# Patient Record
Sex: FEMALE | Race: WHITE
[De-identification: names, ages, dates, MRNs, and addresses within clinical notes are randomized per-mention and may not be internally consistent; named-entity substitution may affect disease eponyms.]

---

## 2018-06-22 ENCOUNTER — HOSPITAL ENCOUNTER (OUTPATIENT)
Dept: HOSPITAL 63 - SURG | Age: 33
Discharge: HOME | End: 2018-06-22
Attending: ANESTHESIOLOGY
Payer: OTHER GOVERNMENT

## 2018-06-22 DIAGNOSIS — M54.5: Primary | ICD-10-CM

## 2018-06-22 DIAGNOSIS — M54.2: ICD-10-CM

## 2018-06-22 PROCEDURE — 99204 OFFICE O/P NEW MOD 45 MIN: CPT

## 2021-03-10 ENCOUNTER — HOSPITAL ENCOUNTER (OUTPATIENT)
Dept: HOSPITAL 63 - RAD | Age: 36
End: 2021-03-10
Attending: SPECIALIST
Payer: OTHER GOVERNMENT

## 2021-03-10 DIAGNOSIS — M25.571: Primary | ICD-10-CM

## 2021-03-10 PROCEDURE — 73630 X-RAY EXAM OF FOOT: CPT

## 2021-03-10 NOTE — RAD
EXAM: Right foot, 3 views.



HISTORY: Pain.



COMPARISON: None.



FINDINGS: 3 views of the right foot are obtained. There is no fracture, dislocation or subluxation. T
here is no foreign body.



IMPRESSION: No acute osseous finding.



Electronically signed by: Lina Soto MD (3/10/2021 1:36 PM) [id number]

## 2021-06-27 ENCOUNTER — HOSPITAL ENCOUNTER (EMERGENCY)
Dept: HOSPITAL 63 - ER | Age: 36
End: 2021-06-27
Payer: OTHER GOVERNMENT

## 2021-06-27 VITALS — SYSTOLIC BLOOD PRESSURE: 134 MMHG | DIASTOLIC BLOOD PRESSURE: 86 MMHG

## 2021-06-27 VITALS — BODY MASS INDEX: 31.13 KG/M2 | WEIGHT: 182.32 LBS | HEIGHT: 64 IN
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
## 2021-06-27 DIAGNOSIS — S60.222A: Primary | ICD-10-CM
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
## 2021-06-27 PROCEDURE — 73130 X-RAY EXAM OF HAND: CPT

## 2021-06-27 PROCEDURE — 99283 EMERGENCY DEPT VISIT LOW MDM: CPT

## 2021-06-27 NOTE — RAD
Exam performed: X-ray right hand



HISTORY: Smashed right hand.



DATE OF SERVICE: 6/27/2021.



COMPARISON: None available



FINDINGS:



AP, lateral and oblique views of the right hand is obtained. Normal alignment is preserved. There is 
no acute fracture or dislocation. Mild soft tissue swelling is seen. No foreign body.



IMPRESSION:



Mild soft tissue swelling without underlying bony abnormality.



Electronically signed by: Milly Beasley MD (6/27/2021 1:53 PM) [facility]

## 2021-06-27 NOTE — PHYS DOC
General Adult


EDM:


Chief Complaint:  HAND PROBLEM





HPI:


HPI:





Patient is a 35-year-old female who presents with left hand pain.  Patient 

states that she was trying to put a trampoline together when she smashed her 

hand with the spring.  Patient has bruising to her left hand.  Full range of 

motion intact.  Denies taking anything for pain.  Patient has been icing left 

hand.


 (ELIAS LINARES)





Review of Systems:


Review of Systems:


Constitutional:  Denies fever or chills 


Eyes:  Denies change in visual acuity 


HENT:  Denies nasal congestion or sore throat 


Respiratory:  Denies cough or shortness of breath 


Cardiovascular:  Denies chest pain or edema 


GI:  Denies abdominal pain, nausea, vomiting, bloody stools or diarrhea 


: Denies dysuria 


Musculoskeletal: Reports left hand pain


Integument: Reports bruising to left hand


Neurologic:  Denies headache, focal weakness or sensory changes 


Endocrine:  Denies polyuria or polydipsia 


Lymphatic:  Denies swollen glands 


Psychiatric:  Denies depression or anxiety


 (ELIAS LINARES)





Current Medications:


Current Meds:





Current Medications








 Medications


  (Trade)  Dose


 Ordered  Sig/Jud  Start Time


 Stop Time Status Last Admin


Dose Admin


 


 Acetaminophen


  (Tylenol)  500 mg  1X  ONCE  6/27/21 14:15


 6/27/21 14:16 UNV  











 (ELIAS LINARES)





Allergies:


Allergies:





Allergies








Coded Allergies Type Severity Reaction Last Updated Verified


 


  No Known Drug Allergies    6/27/21 No








 (ELIAS LINARES)





Physical Exam:


PE:





Constitutional: Well developed, well nourished, no acute distress, non-toxic 

appearance. []


HENT: Normocephalic, atraumatic, bilateral external ears normal, oropharynx 

moist, no oral exudates, nose normal. []


Eyes: PERRLA, EOMI, conjunctiva normal, no discharge. [] 


Neck: Normal range of motion, no tenderness, supple, no stridor. [] 


Cardiovascular:Heart rate regular rhythm, no murmur []


Lungs & Thorax:  Bilateral breath sounds clear to auscultation []


Abdomen: Bowel sounds normal, soft, no tenderness, no masses, no pulsatile 

masses. [] 


Skin: Bruising to left hand


Back: No tenderness, no CVA tenderness. [] 


Extremities: Left hand tenderness, no cyanosis,  ROM intact, no edema. [] 


Neurologic: Alert and oriented X 3, normal motor function, normal sensory 

function, no focal deficits noted. []


Psychologic: Affect normal, judgement normal, mood normal. []


 (ELIAS LINARES)





EKG:


EKG:


[]


 (ELIAS LINARES)





Radiology/Procedures:


Radiology/Procedures:


[]


 (ELIAS LINARES)





Heart Score:


C/O Chest Pain:  No


Risk Factors:


Risk Factors:  DM, Current or recent (<one month) smoker, HTN, HLP, family 

history of CAD, obesity.


Risk Scores:


Score 0 - 3:  2.5% MACE over next 6 weeks - Discharge Home


Score 4 - 6:  20.3% MACE over next 6 weeks - Admit for Clinical Observation


Score 7 - 10:  72.7% MACE over next 6 weeks - Early Invasive Strategies


 (ELIAS LINARES)





Course & Med Decision Making:


Course & Med Decision Making


Pertinent Labs and Imaging studies reviewed. (See chart for details)





[] 35-year-old female presents with left hand pain after smashing her hand with 

a trampoline spring.  Patient reports pain and bruising to her left hand.  

Patient has full range of motion.  Left hand x-ray ordered to rule out fracture.





X-ray of left hand was negative for fracture.  Patient given Tylenol for 

discomfort.





Discussed radiology results with patient.  Explained to patient to take Tylenol 

and ibuprofen at home for pain.  Rice instructions given.  Patient is 

appreciative and okay with discharge plan.


 (ELIAS LINARES)


Course & Med Decision Making


I oversaw on the above date of service of this patient. This patient was 

evaluated, examined, treated, and dispositioned from the emergency department by

 the mid-level practitioner.  Although I was working at the time and available 

for consultation, no assistance was requested and I did not see or immediately 

direct the care of this patient. I reviewed note and agree to findings, plan of 

care, and disposition as stated. 





Electronically signed, Mak Machuca DO


 (MAK MACHUCA DO)


William Disclaimer:


Dragon Disclaimer:


This electronic medical record was generated, in whole or in part, using a voice

 recognition dictation system.


 (ELIAS LINARES)





Departure


Departure:


Impression:  


   Primary Impression:  


   Hand injury


   Qualified Codes:  S69.91XA - Unspecified injury of right wrist, hand and 

   finger(s), initial encounter


Disposition:  01 HOME / SELF CARE / HOMELESS


Condition:  STABLE


Referrals:  


JEAN SARMIENTO MD (PCP)


Patient Instructions:  Hand Contusion, Easy-to-Read, RICE - Routine Care for 

Injuries





Additional Instructions:  


You are seen in the emergency room for right hand pain.  X-ray of your hand was 

negative for any acute fractures.  You can use Tylenol at home for discomfort.  

Ice to the area to help with swelling.  If you continue to have pain follow-up 

with your PCP or return to the emergency room with worsening symptoms or 

concerns.





EMERGENCY DEPARTMENT GENERAL DISCHARGE INSTRUCTIONS





Thank you for coming to [facility] (ED) today and trusting us

 with you 


care.  We trust that you had a positivie experience in our Emergency Department.

  If you 


wish to speak to the department management, you may call the director at 

[phone].





YOUR FOLLOW UP INSTRUCTIONS ARE AS FOLLOWS:





1.  Do you have a private Doctor?  If you do not have a private doctor, please 

ask for a 


resource list of physicians or clinics that may be able to assist you with 

follow up care.





2.  The Emergency Physician has interpreted your x-rays.  The X-Ray specialist 

will also 


review them.  If there is a change in the findings, you will be notified in 48 

hours when at 


all possible.





3.  A lab test or culture has been done, your results will be reviewed and you 

will be 


notified if you need a change in treatment.





ADDITIONAL INSTRUCTIONS AND INFORMATION:





1.  Your care today has been supervised by a physician who is specially trained 

in emergency 


care.  Many problems require more than one evaluation for a complete diagnosis 

and 


treatment.  We recommend that you schedule your follow up appointment as 

recommended to 


ensure complete treatment of you illness or injury.  If you are unable to obtain

 follow up 


care and continue to have a problem, or if your condition worsens, we recommend 

that you 


return to the ED.





2.  We are not able to safely determine your condition over the phone nor are we

 able to 


give sound medical advice over the phone.  For these safety reasons, if you call

 for medical 


advice we will ask you to come to the ED for further evaluation.





3.  If you have any questions regarding these discharge instructions please call

 the ED at 


[phone].





SAFETY INFORMATION:





In the interest of safety, wellness, and injury prevention; we encourage you to 

wear your 


sealbelt, if you smoke; quite smoking, and we encourage family to use a 

protective helmet 


for bicycling and other sporting events that present an increased risk for head 

injury.





IF YOUR SYMPTOMS WORSEN OR NEW SYMPTOMS DEVELOP, OR YOU HAVE CONCERNS ABOUT YOUR

 CONDITION; 


OR IF YOUR CONDITION WORSENS WHILE YOU ARE WAITING FOR YOUR FOLLOW UP 

APPOINTMENT; EITHER 


CONTACT YOUR PRIMARY CARE DOCTOR, THE PHYSICIAN WHOSE NAME AND NUMBER YOU WERE 

GIVEN, OR 


RETURN TO THE ED IMMEDIATELY.











ELIAS LINARES               Jun 27, 2021 14:09


MAK MACHUCA DO                 Jun 28, 2021 07:14